# Patient Record
Sex: FEMALE | Race: WHITE | Employment: OTHER | URBAN - METROPOLITAN AREA
[De-identification: names, ages, dates, MRNs, and addresses within clinical notes are randomized per-mention and may not be internally consistent; named-entity substitution may affect disease eponyms.]

---

## 2021-09-09 ENCOUNTER — HOSPITAL ENCOUNTER (EMERGENCY)
Facility: CLINIC | Age: 51
Discharge: HOME | End: 2021-09-09
Attending: EMERGENCY MEDICINE | Admitting: EMERGENCY MEDICINE
Payer: COMMERCIAL

## 2021-09-09 VITALS
DIASTOLIC BLOOD PRESSURE: 71 MMHG | OXYGEN SATURATION: 100 % | HEART RATE: 87 BPM | RESPIRATION RATE: 20 BRPM | TEMPERATURE: 98.1 F | SYSTOLIC BLOOD PRESSURE: 113 MMHG | WEIGHT: 129 LBS

## 2021-09-09 DIAGNOSIS — S70.11XA HEMATOMA OF RIGHT THIGH, INITIAL ENCOUNTER: ICD-10-CM

## 2021-09-09 PROCEDURE — 99282 EMERGENCY DEPT VISIT SF MDM: CPT | Performed by: EMERGENCY MEDICINE

## 2021-09-10 NOTE — ED PROVIDER NOTES
History     Chief Complaint   Patient presents with     Leg Injury     R thigh     HPI  Keely Shane is a 51 year old female who presents for right thigh pain.  Started last evening.  The patient was using her leg to push the cardboard down in her recycling bin when she slipped and hit her thigh hard against the side of the bin.  Immediate onset of pain at that time.  She applied ice and it seemed to get better but then over the last day the swelling and bruising has gotten worse.  It feels tight in the medial portion of the thigh.  Pain is moderate, hurts to walk and move.  No nausea, vomiting, fevers.  No numbness or tingling distal portion of the leg.  She is not on blood thinners.  No prior history of bleeding disorders or heavy periods.    Allergies:  No Known Allergies    Problem List:    Patient Active Problem List    Diagnosis Date Noted     Closed dislocation of patella 07/24/2007     Priority: Medium        Past Medical History:    No past medical history on file.    Past Surgical History:    No past surgical history on file.    Family History:    No family history on file.    Social History:  Marital Status:   [2]  Social History     Tobacco Use     Smoking status: Not on file   Substance Use Topics     Alcohol use: Not on file     Drug use: Not on file        Medications:    No current outpatient medications on file.        Review of Systems  A 3 point review of systems was performed. All pertinent positives and negatives were listed in the HPI and rest of ROS were otherwise negative.    Physical Exam   BP: 113/71  Pulse: 87  Temp: 98.1  F (36.7  C)  Resp: 20  Weight: 58.5 kg (129 lb)  SpO2: 100 %      Physical Exam  Constitutional:       General: She is not in acute distress.     Appearance: She is well-developed. She is not diaphoretic.   HENT:      Head: Normocephalic and atraumatic.   Eyes:      General: No scleral icterus.  Cardiovascular:      Pulses:           Dorsalis pedis pulses are  2+ on the right side.        Posterior tibial pulses are 2+ on the right side.   Musculoskeletal:      Cervical back: Normal range of motion and neck supple.   Skin:     General: Skin is warm and dry.      Coloration: Skin is not pale.      Comments: Large hematoma and swelling over the medial portion of the right thigh   Neurological:      Mental Status: She is alert and oriented to person, place, and time.             ED Course        Procedures              Critical Care time:  none               No results found for this or any previous visit (from the past 24 hour(s)).    Medications - No data to display    Assessments & Plan (with Medical Decision Making)   51-year-old female presents for evaluation of pain and bruising of the medial portion of her right thigh.  Temperature is 36.7  C, heart rate 87, SPO2 is 100% on room air.  She has been ambulatory on the thigh no signs of fracture or dislocation, no indication for imaging at this time.  She has good pulses distal to the injury, no signs of arterial injury.  No signs of cellulitis on exam.  Likely hematoma as a cause of her symptoms.  We discussed compression, elevation, ice or heat, acetaminophen, and ibuprofen for symptoms.  She is told to return if she has signs of infection or cellulitis, otherwise follow-up in clinic for recheck in the next 1 to 2 weeks.  The patient is in agreement with this plan.    I have reviewed the nursing notes.    I have reviewed the findings, diagnosis, plan and need for follow up with the patient.       New Prescriptions    No medications on file       Final diagnoses:   Hematoma of right thigh, initial encounter       9/9/2021   [facility] EMERGENCY DEPT     Linden Mullins MD  09/09/21 [id number]

## 2021-09-10 NOTE — ED TRIAGE NOTES
Last noc pt was standing on cardboard in recycling bin pushing it down, the cardboard slipped, bin fell over, pt has bruising and swelling on L thigh.

## 2021-09-10 NOTE — DISCHARGE INSTRUCTIONS
Use compression, elevation, acetaminophen, and ibuprofen for symptoms.  Apply ice or heat for 10 to 15 minutes at a time every 1-2 hours for the next several days.  Watch for signs of infection with increased redness, warmth, pain, or if you feel ill.  If any of these are present get rechecked right away.  Get this rechecked in the next week or 2 in primary care.

## 2023-09-01 ENCOUNTER — TELEPHONE (OUTPATIENT)
Dept: BEHAVIORAL HEALTH | Facility: CLINIC | Age: 53
End: 2023-09-01
Payer: COMMERCIAL

## 2023-09-01 NOTE — TELEPHONE ENCOUNTER
Pt is a(n) adult (18+ out of HS) Seeking as eval for Adult BRIAN Assessment for primary substance use treatment (OP BRAIN programs including Co-occurring).  Appointment scheduled by:  Patient.  (self-pay - complete Cost Estimate)       needed?  NO    Contact information verified/updated: Yes    Kamala England

## 2023-09-08 ENCOUNTER — TELEPHONE (OUTPATIENT)
Dept: BEHAVIORAL HEALTH | Facility: CLINIC | Age: 53
End: 2023-09-08

## 2023-09-08 NOTE — TELEPHONE ENCOUNTER
9/8/2023    Left voice message regarding date and time of appt, and my number asking for a return call.    Gayla Soria [facility] - Patient Navigator   [email or web address]  Direct phone: [phone]    Transportation service

## 2023-09-12 NOTE — TELEPHONE ENCOUNTER
9/12/2023    Left voice message regarding date and time of appt, and my number asking for a return call.    Gayla Soria [facility] - Patient Navigator   [email or web address]  Direct phone: [phone]

## 2023-09-13 NOTE — TELEPHONE ENCOUNTER
This patient was a no call/no show to her 10:30 am substance use disorder assessment appointment today on 9/13/2023.  The patient had not arrived to [facility] for the appointment as of 10:55 am.  The patient should be directed to call the [facility] Intake department at [phone] to re-schedule the substance use disorder assessment as needed.